# Patient Record
Sex: FEMALE | Race: OTHER | Employment: UNEMPLOYED | URBAN - NONMETROPOLITAN AREA
[De-identification: names, ages, dates, MRNs, and addresses within clinical notes are randomized per-mention and may not be internally consistent; named-entity substitution may affect disease eponyms.]

---

## 2023-02-05 ENCOUNTER — HOSPITAL ENCOUNTER (EMERGENCY)
Age: 38
Discharge: HOME | End: 2023-02-05
Attending: EMERGENCY MEDICINE

## 2023-02-05 VITALS
WEIGHT: 145 LBS | HEIGHT: 64 IN | OXYGEN SATURATION: 99 % | BODY MASS INDEX: 24.75 KG/M2 | DIASTOLIC BLOOD PRESSURE: 94 MMHG | HEART RATE: 89 BPM | RESPIRATION RATE: 19 BRPM | TEMPERATURE: 98.5 F | SYSTOLIC BLOOD PRESSURE: 156 MMHG

## 2023-02-05 DIAGNOSIS — K08.89 PAIN, DENTAL: Primary | ICD-10-CM

## 2023-02-05 LAB
EKG ATRIAL RATE: 98 BPM
EKG P AXIS: 73 DEGREES
EKG P-R INTERVAL: 156 MS
EKG Q-T INTERVAL: 350 MS
EKG QRS DURATION: 82 MS
EKG QTC CALCULATION (BAZETT): 446 MS
EKG R AXIS: -42 DEGREES
EKG T AXIS: 77 DEGREES
EKG VENTRICULAR RATE: 98 BPM

## 2023-02-05 PROCEDURE — 93005 ELECTROCARDIOGRAM TRACING: CPT | Performed by: EMERGENCY MEDICINE

## 2023-02-05 PROCEDURE — 93010 ELECTROCARDIOGRAM REPORT: CPT | Performed by: INTERNAL MEDICINE

## 2023-02-05 PROCEDURE — 99284 EMERGENCY DEPT VISIT MOD MDM: CPT | Performed by: EMERGENCY MEDICINE

## 2023-02-05 PROCEDURE — 6370000000 HC RX 637 (ALT 250 FOR IP): Performed by: EMERGENCY MEDICINE

## 2023-02-05 RX ORDER — OXYCODONE HYDROCHLORIDE AND ACETAMINOPHEN 5; 325 MG/1; MG/1
1 TABLET ORAL EVERY 6 HOURS PRN
Qty: 12 TABLET | Refills: 0 | Status: SHIPPED | OUTPATIENT
Start: 2023-02-05 | End: 2023-02-08

## 2023-02-05 RX ORDER — CLINDAMYCIN HYDROCHLORIDE 150 MG/1
450 CAPSULE ORAL 3 TIMES DAILY
Qty: 63 CAPSULE | Refills: 0 | Status: SHIPPED | OUTPATIENT
Start: 2023-02-05 | End: 2023-02-12

## 2023-02-05 RX ORDER — HYDROCODONE BITARTRATE AND ACETAMINOPHEN 5; 325 MG/1; MG/1
1 TABLET ORAL ONCE
Status: COMPLETED | OUTPATIENT
Start: 2023-02-05 | End: 2023-02-05

## 2023-02-05 RX ORDER — IBUPROFEN 600 MG/1
600 TABLET ORAL EVERY 6 HOURS PRN
Qty: 28 TABLET | Refills: 0 | Status: SHIPPED | OUTPATIENT
Start: 2023-02-05 | End: 2023-02-12

## 2023-02-05 RX ADMIN — HYDROCODONE BITARTRATE AND ACETAMINOPHEN 1 TABLET: 5; 325 TABLET ORAL at 13:50

## 2023-02-05 ASSESSMENT — PAIN DESCRIPTION - LOCATION: LOCATION: MOUTH;NECK

## 2023-02-05 ASSESSMENT — PAIN SCALES - GENERAL
PAINLEVEL_OUTOF10: 9
PAINLEVEL_OUTOF10: 7

## 2023-02-05 NOTE — ED NOTES
Pt arrives to ED from home with c/o dental pain and hypertension, pt states she has been seen for the dental pain, has a referral and appointment next week.  She states she noticed swelling and has been unable to sleep because of the pain      [place] Phlegm, RN  02/05/23 6925

## 2023-02-05 NOTE — ED PROVIDER NOTES

## 2023-02-05 NOTE — ED PROVIDER NOTES
[facility] ENCOUNTER          Pt Name: Mainor Chandler  MRN: [id number]  [place] 1985  Date of evaluation: 2/5/2023  Emergency Physician: Yanni Chapman MD  Attending: Dr. Bobo Palmer       Chief Complaint   Patient presents with    Dental Pain     History obtained from the patient. HISTORY OF PRESENT ILLNESS    HPI  Mainor Chandler is a 40 y.o. female who presents to the emergency department for evaluation of Left lower molar tooth pain. States that she has been having pain and her lower left molar for approximately a week and a half. She thinks that she broke that tooth while she was eating something, but cannot remember exactly when. It has been less than a month. The patient states that the pain got so bad a week ago that she made an emergency appointment with [facility].  She went to the dentist and they told her that it would cost about $5000 for her to have this tooth removed because it is broken into fragments. The patient does not have dental insurance and was unable to do this. He was placed on Keflex by the dentist, finished her 1 week course, and her pain has only gotten worse. She is taking Motrin, with little relief. She states that the pain has spread from the area of her tooth to her ear, head, and that side of her neck. She also feels like her face is truong than usual.  She has not had any fevers, changes in vision or hearing, nausea, vomiting, dizziness. The patient has no other acute complaints at this time. REVIEW OF SYSTEMS   Review of Systems    See HPI. PAST MEDICAL AND SURGICAL HISTORY   History reviewed. No pertinent past medical history. History reviewed. No pertinent surgical history. MEDICATIONS   No current facility-administered medications for this encounter.     Current Outpatient Medications:     clindamycin (CLEOCIN) 150 MG capsule, Take 3 capsules by mouth 3 times daily for 7 days, Disp: 63 capsule, Rfl: 0    ibuprofen (IBU) 600 MG tablet, Take 1 tablet by mouth every 6 hours as needed for Pain, Disp: 28 tablet, Rfl: 0    oxyCODONE-acetaminophen (PERCOCET) 5-325 MG per tablet, Take 1 tablet by mouth every 6 hours as needed for Pain for up to 3 days. Intended supply: 3 days. Take lowest dose possible to manage pain Max Daily Amount: 4 tablets, Disp: 12 tablet, Rfl: 0      SOCIAL HISTORY     Social History     Social History Narrative    Not on file     Social History     Tobacco Use    Smoking status: Never    Smokeless tobacco: Never   Vaping Use    Vaping Use: Every day   Substance Use Topics    Alcohol use: Not Currently    Drug use: Never         ALLERGIES   Not on File      FAMILY HISTORY   History reviewed. No pertinent family history. PHYSICAL EXAM     ED Triage Vitals   BP Temp Temp src Pulse Resp SpO2 Height Weight   -- -- -- -- -- -- -- --         Additional Vital Signs:  Vitals:    02/05/23 1403   BP: (!) 156/94   Pulse: 89   Resp: 19   Temp: 98.5 °F (36.9 °C)   SpO2: 99%       Physical Exam  Constitutional:       Appearance: Normal appearance. HENT:      Head: Normocephalic and atraumatic. Nose: Nose normal.      Mouth/Throat:      Mouth: Mucous membranes are moist.      Pharynx: Oropharynx is clear. Eyes:      Extraocular Movements: Extraocular movements intact. Pupils: Pupils are equal, round, and reactive to light. Cardiovascular:      Rate and Rhythm: Tachycardia present. Pulmonary:      Effort: Pulmonary effort is normal.      Breath sounds: Normal breath sounds. Abdominal:      Palpations: Abdomen is soft. Musculoskeletal:      Cervical back: Normal range of motion and neck supple. Neurological:      Mental Status: She is alert.          DIFFERENTIALS   Initial Assessment: Given the patient's above chief complaint and findings on history and physical examination, I thought it was appropriate to consider the following emergency medical conditions:      Broken tooth versus dental abscess    Although some of these diagnoses are unlikely they were considered in my medical decision making. Chronic Conditions considered: There is no problem list on file for this patient. ED RESULTS   Laboratory results:  Labs Reviewed - No data to display    Radiologic studies results:  No orders to display       ED Medications administered this visit:   Medications   HYDROcodone-acetaminophen (NORCO) 5-325 MG per tablet 1 tablet (1 tablet Oral Given 2/5/23 1350)         MEDICAL DECISION MAKING      Available laboratory and imaging results were independently reviewed and clinically correlated. Decision Rules/Clinical Scores utilized:  none. Code Status:  Not addressed during this ED visit    Social determinants of health impacting treatment or disposition:  No health insurance    Medical Commodities impacting treatment or disposition:   History reviewed. No pertinent past medical history. Consultants: Not Applicable. Final Assessment and Plan:   80-year-old female presents with 1 and half weeks of worsening dental pain. Recent course of antibiotics with no relief. Patient unable to get definitive treatment for her dental problem due to lack of insurance. Patient was given information on her dentist to find suitable option. She was prescribed clindamycin, pain medication and agreed to follow-up tomorrow with a dentist.      MEDICATION CHANGES     DISCHARGE MEDICATIONS:  Discharge Medication List as of 2/5/2023  2:39 PM        START taking these medications    Details   clindamycin (CLEOCIN) 150 MG capsule Take 3 capsules by mouth 3 times daily for 7 days, Disp-63 capsule, R-0Print      ibuprofen (IBU) 600 MG tablet Take 1 tablet by mouth every 6 hours as needed for Pain, Disp-28 tablet, R-0Print      oxyCODONE-acetaminophen (PERCOCET) 5-325 MG per tablet Take 1 tablet by mouth every 6 hours as needed for Pain for up to 3 days.  Intended supply: 3 days. Take lowest dose possible to manage pain Max Daily Amount: 4 tablets, Disp-12 tablet, R-0Print                  FINAL DISPOSITION     Final diagnoses:   Pain, dental     Condition: condition: stable  Dispo: Discharge to home    PATIENT REFERRED TO:  No follow-up provider specified. The results of pertinent diagnostic studies and exam findings were discussed. The patients provisional diagnosis and plan of care were discussed with the patient and present family who expressed understanding. Any medications were reviewed and indications and risks of medications were discussed with the patient /family present. Strict verbal and written return precautions, instructions and appropriate follow-up provided to  the patient    Critical Care Time   CRITICAL CARE:  None    PROCEDURES: (None if blank)  Procedures:   MDM      This transcription was electronically signed. Parts of this transcriptions may have been dictated by use of voice recognition software and electronically transcribed, and parts may have been transcribed with the assistance of an ED scribe. The transcription may contain errors not detected in proofreading.     Electronically Signed: Mal De Los Santos MD, 02/05/23, 7:39 PM         Mal De Los Santos MD  Resident  02/05/23 8529